# Patient Record
Sex: MALE | Race: OTHER | Employment: FULL TIME | URBAN - METROPOLITAN AREA
[De-identification: names, ages, dates, MRNs, and addresses within clinical notes are randomized per-mention and may not be internally consistent; named-entity substitution may affect disease eponyms.]

---

## 2018-11-10 ENCOUNTER — HOSPITAL ENCOUNTER (EMERGENCY)
Age: 43
Discharge: HOME | End: 2018-11-10
Attending: EMERGENCY MEDICINE
Payer: SELF-PAY

## 2018-11-10 VITALS
SYSTOLIC BLOOD PRESSURE: 151 MMHG | TEMPERATURE: 99.1 F | BODY MASS INDEX: 29.99 KG/M2 | WEIGHT: 180 LBS | RESPIRATION RATE: 18 BRPM | HEART RATE: 102 BPM | HEIGHT: 65 IN | OXYGEN SATURATION: 97 % | DIASTOLIC BLOOD PRESSURE: 85 MMHG

## 2018-11-10 DIAGNOSIS — B34.9 VIRAL SYNDROME: Primary | ICD-10-CM

## 2018-11-10 DIAGNOSIS — K59.00 CONSTIPATION, UNSPECIFIED CONSTIPATION TYPE: ICD-10-CM

## 2018-11-10 LAB
DEPRECATED S PYO AG THROAT QL EIA: NEGATIVE
FLUAV AG NPH QL IA: NEGATIVE
FLUBV AG NOSE QL IA: NEGATIVE

## 2018-11-10 PROCEDURE — 87147 CULTURE TYPE IMMUNOLOGIC: CPT

## 2018-11-10 PROCEDURE — 87070 CULTURE OTHR SPECIMN AEROBIC: CPT

## 2018-11-10 PROCEDURE — 87880 STREP A ASSAY W/OPTIC: CPT

## 2018-11-10 PROCEDURE — 74011250637 HC RX REV CODE- 250/637: Performed by: EMERGENCY MEDICINE

## 2018-11-10 PROCEDURE — 99283 EMERGENCY DEPT VISIT LOW MDM: CPT

## 2018-11-10 PROCEDURE — 87804 INFLUENZA ASSAY W/OPTIC: CPT

## 2018-11-10 RX ORDER — DOCUSATE SODIUM 100 MG/1
100 CAPSULE, LIQUID FILLED ORAL 2 TIMES DAILY
Qty: 60 CAP | Refills: 0 | Status: SHIPPED | OUTPATIENT
Start: 2018-11-10 | End: 2018-12-10

## 2018-11-10 RX ORDER — IBUPROFEN 600 MG/1
600 TABLET ORAL
Status: COMPLETED | OUTPATIENT
Start: 2018-11-10 | End: 2018-11-10

## 2018-11-10 RX ADMIN — IBUPROFEN 600 MG: 600 TABLET, FILM COATED ORAL at 22:14

## 2018-11-11 NOTE — DISCHARGE INSTRUCTIONS
Infecciones virales: Instrucciones de cuidado - [ Viral Infections: Care Instructions ]  Instrucciones de cuidado    Usted no se siente rosette, jagjit no está fermin qué lo está causando. Podría tener luisito infección viral. Los virus provocan muchas enfermedades, leslie el resfriado común, influenza, fiebre, salpullidos, y la diarrea, las náuseas y el vómito que suelen llamarse \"gastroenteritis viral\". Es posible que se pregunte si los medicamentos antibióticos pueden ayudarle a sentirse mejor. Jagjit los antibióticos tratan únicamente infecciones causadas por bacterias. No funcionan para los virus. La buena noticia es que las infecciones virales generalmente no son graves. La mayoría desaparecen en unos pocos días sin tratamiento médico. Mientras tanto, hay algunas cosas que usted puede hacer para estar más cómodo. La atención de seguimiento es luisito parte clave de orozco tratamiento y seguridad. Asegúrese de hacer y acudir a todas las citas, y llame a orozco médico si está teniendo problemas. También es luisito buena idea saber los resultados de shorty exámenes y mantener luisito lista de los medicamentos que andrew. ¿Cómo puede cuidarse en el hogar? · Descanse lo suficiente si se siente agotado. · [place] un analgésico (medicamento para el dolor) de venta mary, leslie acetaminofén (Tylenol), ibuprofeno (Advil, Motrin) o naproxeno (Aleve), si es necesario. Debbie y siga todas las instrucciones de la [place]. · Tenga cuidado cuando tome medicamentos de venta mary para el resfriado o la gripe y Tylenol al [employer]. Muchos de estos medicamentos contienen acetaminofén, o sea, Tylenol. Debbie las etiquetas para asegurarse de que no esté tomando luisito dosis mayor que la recomendada. El exceso de acetaminofén (Tylenol) puede ser dañino. · Lianne abundantes líquidos, suficientes para que orozco orina sea de color amarillo fermin o henrry leslie el agua.  Si tiene luisito enfermedad del riñón, del corazón o del hígado y tiene que Lanny's líquidos, hable con orozco médico antes de aumentar orozco consumo. · Cuando tenga fiebre, no vaya al Aury Hoar, a la escuela ni a otros lugares públicos. ¿Cuándo debe pedir ayuda? Llame al 911 en cualquier momento que considere que necesita atención de emergencia. Por ejemplo, llame si:    · Tiene dificultad grave para respirar.     · Se desmayó (perdió el conocimiento).    Llame a orozco médico ahora mismo o busque atención médica inmediata si:    · Le parece que está mucho más enfermo.     · Tiene fiebre nueva o más sonia.     · Tiene cameron en las heces.     · Tiene dolor de estómago nuevo o que [facility].     · Tiene un nuevo salpullido.    Preste especial atención a los cambios en orozco jose y asegúrese de comunicarse con orozco médico si:    · [place] a mejorar y luego empeora.     · No mejora leslie se esperaba. ¿Dónde puede encontrar más información en inglés? Asif Pique a http://neptali-tristian.info/. Floydene Jacque K339 en la búsqueda para aprender más acerca de \"Infecciones virales: Instrucciones de cuidado - [ Viral Infections: Care Instructions ]. \"  Revisado: 18 noviembre, 2017  Versión del contenido: 11.8  © 7168-0457 Healthwise, Incorporated. Las instrucciones de cuidado fueron adaptadas bajo licencia por Good Help Connections (which disclaims liability or warranty for this information). Si usted tiene [place] afección médica o sobre estas instrucciones, siempre pregunte a orozco profesional de jose. Healthwise, Incorporated niega toda garantía o responsabilidad por orozco uso de esta información. Estreñimiento: Instrucciones de cuidado - [ Constipation: Care Instructions ]  Instrucciones de cuidado    Tener estreñimiento significa que usted tiene dificultades para eliminar las heces (evacuaciones del intestino). Las personas eliminan heces entre 3 veces al día y [facility] vez cada 3 días. Lo que es normal para usted puede ser [employer]. El estreñimiento puede ocurrir con dolor en el recto y cólicos.  El dolor podría empeorar cuando trata de eliminar las heces. A veces hay pequeñas cantidades de cameron kiko viva en el papel higiénico o en la superficie de las heces. [place] se debe a las venas dilatadas cerca del recto (hemorroides). Algunos cambios en orozco Justin Dk y estilo de shasta podrían ayudarle a evitar el estreñimiento continuo. Es posible que el médico además le recete medicamentos para ayudar a aflojar las heces. Algunos medicamentos pueden causar estreñimiento. [place] incluye los analgésicos (medicamentos para el dolor) y los antidepresivos. Infórmele a orozco médico sobre Yasmeen Lee que usted andrew. Es posible que orozco médico quiera cambiar un medicamento para aliviar shorty síntomas. La atención de seguimiento es luisito parte clave de orozco tratamiento y seguridad. Asegúrese de hacer y acudir a todas las citas, y llame a orozco médico si está teniendo problemas. También es luisito buena idea saber los resultados de shorty exámenes y mantener luisito lista de los medicamentos que andrew. ¿Cómo puede cuidarse en el hogar? · Lianne abundantes líquidos, los suficientes leslie para que orozco orina sea de color amarillo fermin o transparente leslie el agua. Si tiene [employer], del corazón o del hígado y tiene que [facility] líquidos, hable con orozco médico antes de aumentar orozco consumo. · Incluya en orozco dieta diaria alimentos ricos en fibra. Estos incluyen frutas, verduras, frijoles (habichuelas) y granos integrales. · Marialuisa por lo menos 30 minutos de ejercicio la mayoría de los días de la [place]. Caminar es luisito buena opción. Es posible que también quiera hacer otras actividades, leslie correr, nadar, [employer], o jugar al tenis u otros deportes de equipo. · [place] un suplemento de [place], leslie Cit[employer] o Metamucil, todos los GRASSE. Debbie y siga todas las indicaciones de la [place]. · Programe tiempo todos los días para evacuar el intestino. Andre hayward diaria podría ayudar. Tómese orozco tiempo para evacuar el intestino.   · Apoye los pies sobre un banco o taburete pequeño cuando se siente en el inodoro. [place] ayuda a flexionar las caderas y coloca la pelvis en posición de cuclillas. · [place] médico podría recomendarle un laxante de venta mary para aliviar el estreñimiento. Cele Sears son Darin Banegas de Magnesia (Milk of Magnesia) y [place]. Debbie y siga todas las instrucciones de la [place]. No use laxantes de [employer]. ¿Cuándo debe pedir ayuda? Llame a woodard médico ahora mismo o busque atención médica inmediata si:    · Tiene dolor abdominal nuevo o peor.     · Tiene náuseas o vómito nuevos o peores.     · Tiene cameron en las heces.    Preste especial atención a los cambios en woodard jose y asegúrese de comunicarse con woodard médico si:    · [place] estreñimiento empeora.     · No mejora leslie se esperaba. ¿Dónde puede encontrar más información en inglés? Yue Gale a http://neptali-tristian.info/. Escriba P343 en la búsqueda para aprender Majo Magic de \"Estreñimiento: Instrucciones de cuidado - [ Constipation: Care Instructions ]. \"  Revisado: 20 noviembre, 2017  Versión del contenido: 11.8  © 2006-2018 Healthwise, Incorporated. Las instrucciones de cuidado fueron adaptadas bajo licencia por Good [place] Conne[employer] (which disclaims liability or warranty for this information). Si usted tiene [place] afección médica o sobre estas instrucciones, siempre pregunte a [place] profesional de jose. Healthwise, Incorporated niega toda garantía o responsabilidad por woodard uso de esta información.

## 2018-11-11 NOTE — ED TRIAGE NOTES
\"I have had fever for 4 days. \" Patient reports pain in his left abdomen that has been present for about a year, but has worsened today. He also complains of sore throat. He describes having constipation frequently.

## 2018-11-11 NOTE — ED PROVIDER NOTES
36 y.o. male with no significant past medical history who presents ambulatory to the ED with multiple complaints. He states that for the last 4 days he has been having intermittent fevers with associated sore throat and diaphoresis. Pt denies dysuria and N/V/D. He also states that for about a year now, he has been experiencing LUQ pain that is exacerbated by eating but alleviated with bowel movements. He rates his pain a 3/10 in the ED. His last bowel movement was about an hour ago, and was nml. There are no other acute medical concerns at this time. PCP: None Note written by Phill Chavez, as dictated by Douglas Kelly MD 9:52 PM 
 
 
 
The history is provided by the patient. No  was used. No past medical history on file. No past surgical history on file. No family history on file. Social History Socioeconomic History  Marital status:  Spouse name: Not on file  Number of children: Not on file  Years of education: Not on file  Highest education level: Not on file Social Needs  Financial resource strain: Not on file  Food insecurity - worry: Not on file  Food insecurity - inability: Not on file  Transportation needs - medical: Not on file  Transportation needs - non-medical: Not on file Occupational History  Not on file Tobacco Use  Smoking status: Not on file Substance and Sexual Activity  Alcohol use: Not on file  Drug use: Not on file  Sexual activity: Not on file Other Topics Concern  Not on file Social History Narrative  Not on file ALLERGIES: Patient has no known allergies. Review of Systems Constitutional: Positive for diaphoresis and fever. Negative for appetite change, chills and unexpected weight change. HENT: Positive for sore throat. Negative for ear pain, hearing loss, rhinorrhea and trouble swallowing. Eyes: Negative for pain and visual disturbance. Respiratory: Negative for cough, chest tightness and shortness of breath. Cardiovascular: Negative for chest pain and palpitations. Gastrointestinal: Positive for abdominal pain. Negative for abdominal distention, blood in stool, constipation, diarrhea, nausea and vomiting. Genitourinary: Negative for difficulty urinating, dysuria, hematuria and urgency. Musculoskeletal: Negative for back pain and myalgias. Skin: Negative for rash. Neurological: Negative for dizziness, syncope, weakness and numbness. Psychiatric/Behavioral: Negative for confusion and suicidal ideas. All other systems reviewed and are negative. Vitals:  
 11/10/18 2140 BP: 151/85 Pulse: (!) 102 Resp: 18 Temp: 99.1 °F (37.3 °C) SpO2: 97% Weight: 81.6 kg (180 lb) Height: 5' 5\" (1.651 m) Physical Exam  
Constitutional: He is oriented to person, place, and time. He appears well-developed and well-nourished. No distress. NAD, AxOx4, speaking in complete sentences HENT:  
Head: Normocephalic and atraumatic. Right Ear: Hearing normal. No tenderness. Left Ear: Hearing normal. No tenderness. Mouth/Throat: Uvula is midline. No oral lesions. No trismus in the jaw. No uvula swelling. Posterior oropharyngeal erythema present. No oropharyngeal exudate. Tonsils are 2+ on the right. Tonsils are 2+ on the left. Tonsillar exudate. Eyes: Conjunctivae and EOM are normal. Pupils are equal, round, and reactive to light. Right eye exhibits no discharge. Left eye exhibits no discharge. Neck: Normal range of motion. Neck supple. Cardiovascular: Normal rate, regular rhythm and intact distal pulses. Exam reveals no gallop and no friction rub. No murmur heard. Pulmonary/Chest: Effort normal and breath sounds normal. No respiratory distress. He has no wheezes. He has no rales. He exhibits no tenderness. Abdominal: Soft.  Bowel sounds are normal. He exhibits no distension and no mass. There is no tenderness. There is no rebound and no guarding. nttp Genitourinary:  
Genitourinary Comments: No palpable inguinal hernias Pt denies urinary/ Testicular/ scrotal or penile  complaints Musculoskeletal: Normal range of motion. He exhibits no edema, tenderness or deformity. Lymphadenopathy:  
  He has no cervical adenopathy. Neurological: He is alert and oriented to person, place, and time. No cranial nerve deficit. Coordination normal.  
pt has motor/ CV/ Sensation grossly intact to all extremities, R = L in strength;  
Skin: Skin is warm and dry. No rash noted. No erythema. Psychiatric: He has a normal mood and affect. Nursing note and vitals reviewed. MDM Procedures 10:40 PM Pt w/ exudates/ neg swabs/ will culture/ abd pain (x 1 year) resolved after BM today/ will give colace RX and pcp follow-up; Pt agrees w/ plans; [employer]  results have been reviewed with him. He has been counseled regarding his diagnosis. He verbally conveys understanding and agreement of the signs, symptoms, diagnosis, treatment and prognosis and additionally agrees to Call/ Arrange follow up as recommended with Dr. None in 24 - 48 hours. He also agrees with the care-plan and conveys that all of his questions have been answered. I have also put together some discharge instructions for him that include: 1) educational information regarding their diagnosis, 2) how to care for their diagnosis at home, as well a 3) list of reasons why they would want to return to the ED prior to their follow-up appointment, should their condition change or for concerns.

## 2018-11-13 LAB
BACTERIA SPEC CULT: ABNORMAL
BACTERIA SPEC CULT: ABNORMAL
SERVICE CMNT-IMP: ABNORMAL

## 2019-08-15 ENCOUNTER — OFFICE VISIT (OUTPATIENT)
Dept: FAMILY MEDICINE CLINIC | Age: 44
End: 2019-08-15

## 2019-08-15 VITALS
TEMPERATURE: 98.2 F | SYSTOLIC BLOOD PRESSURE: 123 MMHG | DIASTOLIC BLOOD PRESSURE: 75 MMHG | WEIGHT: 172 LBS | BODY MASS INDEX: 28.66 KG/M2 | OXYGEN SATURATION: 96 % | HEART RATE: 72 BPM | HEIGHT: 65 IN | RESPIRATION RATE: 16 BRPM

## 2019-08-15 DIAGNOSIS — F10.11 HISTORY OF ALCOHOL ABUSE: ICD-10-CM

## 2019-08-15 DIAGNOSIS — R10.12 LEFT UPPER QUADRANT PAIN: Primary | ICD-10-CM

## 2019-08-15 DIAGNOSIS — F19.91 HISTORY OF DRUG USE: ICD-10-CM

## 2019-08-15 DIAGNOSIS — G47.00 INSOMNIA, UNSPECIFIED TYPE: ICD-10-CM

## 2019-08-15 RX ORDER — OMEPRAZOLE 20 MG/1
20 CAPSULE, DELAYED RELEASE ORAL DAILY
Qty: 30 CAP | Refills: 0 | Status: SHIPPED | OUTPATIENT
Start: 2019-08-15 | End: 2019-10-07 | Stop reason: SDUPTHER

## 2019-08-15 NOTE — PATIENT INSTRUCTIONS
Dolor abdominal: Instrucciones de cuidado - [ Abdominal Pain: Care Instructions ] Instrucciones de cuidado El dolor abdominal tiene muchas causas posibles. Algunas de ellas no son graves y mejoran por sí solas en unos días. Otras requieren Radha Tabitha y [place]. Si orozco dolor continúa o [place], necesitará luisito nueva revisión y [place] pruebas para determinar qué pasa. Es posible que necesite cirugía para corregir el problema. No ignore nuevos síntomas, leslie fiebre, náuseas y [place], [facility] [place], dolor que [place] o [place]. Podrían ser señales de un problema más grave. Orozco médico puede haberle recomendado luisito consulta de Wenceslao & Albertina las 8 o 12 horas siguientes. Si no se siente mejor, es posible que requiera Radha Tabitha o [place]. El médico lo fernandez revisado minuciosamente, fox puede delilah problemas más tarde. Si nota algún problema o síntomas nuevos, busque tratamiento médico inmediatamente. La atención de seguimiento es luisito parte clave de orozco tratamiento y seguridad. Asegúrese de hacer y acudir a todas las citas, y llame a orozco médico si está teniendo problemas. También es luisito buena idea saber los resultados de shorty exámenes y mantener luisito lista de los medicamentos que andrew. Cómo puede cuidarse en el hogar? · Descanse hasta que se sienta mejor. · Para prevenir la deshidratación, ernst abundantes líquidos, suficientes para que orozco orina sea de color amarillo fermin o transparente leslie el agua. Elija beber agua y otros líquidos shital sin cafeína hasta que se sienta mejor. Si tiene [facility] [employer], del corazón o del hígado y tiene que Leoma's líquidos, hable con orozco médico antes de aumentar orozco consumo. · Si tiene [place] [employer], coma alimentos suaves, leslie arroz, pan santana seco o galletas saladas, bananas (plátanos) y puré de Synchari. Trate de comer varias comidas pequeñas al día en lugar de dos o matilda grandes. · Espere hasta 48 horas después de que todos los síntomas hayan desaparecido antes de comer alimentos condimentados, alcohol y bebidas que contengan cafeína. · No consuma alimentos ricos en grasa. · Evite medicamentos antiinflamatorios leslie aspirina, ibuprofeno (Advil, Motrin) y naproxeno (Aleve). Pueden causar [place] [employer]. Dígale a orozco médico si está tomando aspirina diariamente debido a otro problema de jose. Cuándo debe pedir ayuda? Llame al 911 en cualquier momento que considere que necesita atención de emergencia. Por ejemplo, llame si: 
  · Se desmayó (perdió el conocimiento).   · Las heces son de color rojizo o muy sanguinolentas (con cameron).   · Vomita cameron o algo parecido a granos de café molido.  
  · Tiene dolor abdominal nuevo e intenso.  
 Llame a orozco médico ahora mismo o busque atención médica inmediata si: 
  · Orozco dolor empeora, sobre todo si se concentra en luisito tony parte del vientre.  
  · Vuelve a tener fiebre o tiene fiebre más sonia.  
  · Viviana heces son negruzcas y parecidas al alquitrán o tienen rastros de cameron.  
  · Tiene sangrado vaginal inesperado.  
  · Tiene síntomas de liusito infección del tracto urinario. Estos podrían incluir: ? Dolor al Earlie Oddi. ? Orinar con más frecuencia que lo habitual. 
? [place] en la [facility].  
  · Siente mareos o aturdimiento, o que está a punto de desmayarse.  
 Preste especial atención a los cambios en orozco jose y asegúrese de comunicarse con orozco médico si: 
  · No está mejorando después de 1 día (24 horas). Dónde puede encontrar más información en inglés? Keeley Mason a http://neptali-tristian.info/. Andreina Mcdermott K175 en la búsqueda para aprender más acerca de \"Dolor abdominal: Instrucciones de cuidado - [ Abdominal Pain: Care Instructions ]. \" 
Revisado: 23 septiembre, 2018 Versión del contenido: 12.1 © 5554-3392 Healthwise, Incorporated.  Las instrucciones de cuidado fueron adaptadas bajo licencia por Good [employer] (which disclaims liability or warranty for this information). Si usted tiene [place] afección médica o sobre estas instrucciones, siempre pregunte a orozco profesional de jose. [employer], Incorporated niega toda garantía o responsabilidad por orozco uso de esta información. Insomnio: Instrucciones de cuidado - [ Insomnia: Care Instructions ] Instrucciones de cuidado El insomnio es la incapacidad para dormir rosette. Es un problema común para la mayoría de las personas en algún momento. El insomnio puede hacer que resulte difícil dormirse, permanecer dormido o dormir el tiempo necesario. [place] puede provocarle fatiga y mal humor oli el día. También puede hacer que esté olvidadizo e infeliz y que sea menos eficiente en [place]. Algunos trastornos, [employer] depresión o la ansiedad, pueden provocar insomnio. El dolor también puede afectar orozco capacidad para dormir. Cuando se resuelven [place], el insomnio generalmente desaparece. A veces, los malos hábitos de sueño pueden provocar insomnio. Si el insomnio le afecta en orozco trabajo o orozco capacidad para disfrutar la shasta, puede yolanda medidas para mejorar el sueño. La atención de seguimiento es luisito parte clave de orozco tratamiento y seguridad. Asegúrese de hacer y acudir a todas las citas, y llame a orozco médico si está teniendo problemas. También es luisito buena idea saber los resultados de shorty exámenes y mantener luisito lista de los medicamentos que andrew. Cómo puede cuidarse en el hogar? Bethanie Hanly · No tome bebidas con cafeína, leslie café o té bud, oli las 8 horas antes de [place]. · No fume ni use otros tipos de tabaco cerca de la hora de acostarse. La nicotina es un estimulante y puede mantenerlo despierto. · Evite beber alcohol por la noche, porque puede hacer que se despierte en la mitad de la noche. · No coma luisito comida abundante cerca de la hora de [place].  Si tiene hambre, coma algo liviano. · No tome mucha agua cerca de la hora de [place], porque la necesidad de orinar puede despertarlo oli la noche. · No malik ni rodrigo televisión en la cama. Use la cama solo para dormir y para tener relaciones sexuales. Abida Showers · Clyde Sanchez a dormir a la misma hora todas las noches y levántese a la misma hora cada mañana. No duerma la siesta oli el día. · Mantenga woodard dormitorio silencioso, oscuro y fresco. 
· Duerma con Mario Barr y un colchón cómodos. · Si mirar el reloj le causa ansiedad, gírelo de Eusebia [place]ppton que no pueda michael la hora. · Si tiene preocupaciones cuando se acuesta, lleve un diario de viviana preocupaciones. [place] antes de la hora de [place], escriba las preocupaciones y luego deje de lado el diario y viviana inquietudes. · Pruebe la meditación u otras técnicas de relajación antes de [place]. · Si no puede dormir, levántese y Jackye Min a otra habitación hasta que sienta sueño. Marialuisa algo que lo relaje. Repita la rutina de acostarse antes de volver a la cama. · Marialuisa que woodard hogar esté en silencio y en calma aproximadamente luisito hora antes de acostarse. Baje la intensidad de las luces, apague el televisor y la computadora, y baje el volumen de la [place]. [place] puede ayudarle a relajarse después de un día ajetreado. Cuándo debe pedir ayuda? Preste especial atención a los cambios en woodard jose y asegúrese de comunicarse con woodard médico si: 
  · Viviana esfuerzos por mejorar el sueño no michael resultado.  
  · [place] insomnio empeora.  
  · Se ha estado sintiendo decaído, deprimido o desesperanzado, o ha perdido el interés por cosas que disfrutaba hacer. Dónde puede encontrar más información en inglés? Clyde Sanchez a http://neptali-tristian.info/. Escriba P513 en la búsqueda para aprender más acerca de \"Insomnio: Instrucciones de cuidado - [ Insomnia: Care Instructions ]. \" 
Revisado: 28 junio, 2018 Versión del contenido: 12.1 © 0884-5655 Healthwise, [place]. Las instrucciones de cuidado fueron adaptadas bajo licencia por Good Help Connections (which disclaims liability or warranty for this information). Si usted tiene [place] afección médica o sobre estas instrucciones, siempre pregunte a orozco profesional de jose. [facility], [place] niega toda garantía o responsabilidad por orozco uso de esta información.

## 2019-08-15 NOTE — PROGRESS NOTES
OSIRIS       Ana Rosales is a 39 y.o. male who presents for abdominal pain and to establish care  Abdominal pain: described more as discomfort. Located on left side of abdomen (more towards the ribs), present for the last 2 years, worst with food (doesn't matter what type of food), no radiation. Sometimes takes ibuprofen or tylenol that helps very little  Has regular BM, normal urination   History of kidney stones- last time was in [id number], he passed them on his own   No fever, no diarrhea, no constipation, no blood in stool, no vomiting   Insomnia: since he stopped drinking alcohol. He was taking natural pills (herbal life) that used to help him but not anymore. He has tried melatonin as well but they dont help him anymore  Sometimes he cant fall asleep or sometimes he wakes up 3-4 times at night. He admits he doesn't have a regular schedule to go to bed. Sometimes he goes to bed at 10 or midnight or 2-3 am due to L[employer] day related activities. SH:  Alcohol- stopped drinking 7 years ago. Used to drink a lot- everyday drinker  Smoking- stopped smoking 7 years ago. 1 PPD for 5 years   Drugs- stopped 7 years ago as well. Could not say which ones as his kids were present for interview         PMHx-reviewed:  History reviewed. No pertinent past medical history. Meds-reviewed:   Current Outpatient Medications   Medication Sig Dispense Refill    omeprazole (PRILOSEC) 20 mg capsule Take 1 Cap by mouth daily. 30 Cap 0    ibuprofen (MOTRIN) 800 mg tablet Take 1 Tab by mouth every six (6) hours as needed for Pain. 30 Tab 0     Allergies-reviewed:   No Known Allergies    Smoker-reviewed:  Social History     Tobacco Use   Smoking Status Former Smoker    Last attempt to quit: 8/15/2014    Years since quittin.0   Smokeless Tobacco Never Used     ETOH-reviewed:   Social History     Substance and Sexual Activity   Alcohol Use Not Currently     FH-reviewed:   History reviewed.  No pertinent family history. ROS:  Review of Systems   Constitutional: Negative. HENT: Negative. Respiratory: Negative. Cardiovascular: Negative. Gastrointestinal: Positive for abdominal pain. Negative for blood in stool, constipation, diarrhea, nausea and vomiting. Genitourinary: Negative. Musculoskeletal: Negative. Skin: Negative. Neurological: Negative. Psychiatric/Behavioral: Positive for sleep disturbance. Physical Exam:  Visit Vitals  /75 (BP 1 Location: Left arm, BP Patient Position: Sitting)   Pulse 72   Temp 98.2 °F (36.8 °C) (Oral)   Resp 16   Ht 5' 5\" (1.651 m)   Wt 172 lb (78 kg)   SpO2 96%   BMI 28.62 kg/m²       Wt Readings from Last 3 Encounters:   08/15/19 172 lb (78 kg)   11/10/18 180 lb (81.6 kg)   12/22/16 180 lb (81.6 kg)     BP Readings from Last 3 Encounters:   08/15/19 123/75   11/10/18 151/85   12/22/16 138/87      Physical Exam   Constitutional: He appears well-developed and well-nourished. No distress. HENT:   Right Ear: External ear normal.   Left Ear: External ear normal.   Mouth/Throat: Oropharynx is clear and moist. No oropharyngeal exudate. Eyes: Pupils are equal, round, and reactive to light. Conjunctivae are normal. Right eye exhibits no discharge. Left eye exhibits no discharge. Neck: Normal range of motion. Cardiovascular: Normal rate and regular rhythm. No murmur heard. Pulmonary/Chest: Effort normal and breath sounds normal. He has no wheezes. He has no rales. Abdominal: Soft. Bowel sounds are normal. There is no hepatosplenomegaly. There is tenderness in the left upper quadrant. There is no rigidity, no rebound, no guarding and no CVA tenderness. Musculoskeletal: Normal range of motion. Skin: Skin is warm and dry. No rash noted. Psychiatric: He has a normal mood and affect. His behavior is normal.   Vitals reviewed. Assessment     39 y.o. male with:    ICD-10-CM ICD-9-CM    1.  Left upper quadrant pain R10.12 789.02 CBC W/O DIFF METABOLIC PANEL, COMPREHENSIVE      LIPID PANEL      LIPASE      HEPATITIS PANEL, ACUTE      omeprazole (PRILOSEC) 20 mg capsule   2. Insomnia, unspecified type G47.00 780.52    3. History of alcohol abuse I36.599 014.03 METABOLIC PANEL, COMPREHENSIVE      LIPASE   4. History of drug use Z87.898 305.93 HEPATITIS PANEL, ACUTE              Plan       Orders Placed This Encounter    CBC W/O DIFF    METABOLIC PANEL, COMPREHENSIVE    LIPID PANEL    LIPASE    HEPATITIS PANEL, ACUTE    omeprazole (PRILOSEC) 20 mg capsule     - LUQ pain- differential includes pancreatitis, gastritis, PUD. Will get labs today (as above) and if they're normal, will get CT abdomen for further evaluation. Counseled patient to stop taking ibuprofen due to consideration of gastritis/PUD. Can take tylenol prn for pain. Will start Omeprazole 20 mg and try for 4 weeks to see If this helps with symptoms  - Insomnia- talked to patient about sleep hygiene: no TV in the bedroom, dark room, low temp. Avoid using phone before going to bed    Patient discussed with Dr. Mckayla Pace    I have discussed the diagnosis with the patient and the intended plan as seen in the above orders. The patient has received an after-visit summary and questions were answered concerning future plans. I have discussed medication side effects and warnings with the patient as well.     Yessenia Regalado MD  Family Medicine Resident  PGY-3

## 2019-08-15 NOTE — PROGRESS NOTES
Chief Complaint   Patient presents with   [facility] Care     1. Have you been to the ER, urgent care clinic since your last visit? Hospitalized since your last visit? No    2. Have you seen or consulted any other health care providers outside of the [address] [place] [address] since your last visit? Include any pap smears or colon screening.  No

## 2019-08-16 ENCOUNTER — TELEPHONE (OUTPATIENT)
Dept: FAMILY MEDICINE CLINIC | Age: 44
End: 2019-08-16

## 2019-08-16 DIAGNOSIS — F10.11 HISTORY OF ALCOHOL ABUSE: ICD-10-CM

## 2019-08-16 DIAGNOSIS — E78.1 HYPERTRIGLYCERIDEMIA: ICD-10-CM

## 2019-08-16 DIAGNOSIS — R10.12 LEFT UPPER QUADRANT PAIN: ICD-10-CM

## 2019-08-16 DIAGNOSIS — R74.01 TRANSAMINITIS: Primary | ICD-10-CM

## 2019-08-16 LAB
ALBUMIN SERPL-MCNC: 5 G/DL (ref 3.5–5.5)
ALBUMIN/GLOB SERPL: 1.7 {RATIO} (ref 1.2–2.2)
ALP SERPL-CCNC: 118 IU/L (ref 39–117)
ALT SERPL-CCNC: 67 IU/L (ref 0–44)
AST SERPL-CCNC: 35 IU/L (ref 0–40)
BILIRUB SERPL-MCNC: 0.5 MG/DL (ref 0–1.2)
BUN SERPL-MCNC: 16 MG/DL (ref 6–24)
BUN/CREAT SERPL: 19 (ref 9–20)
CALCIUM SERPL-MCNC: 10 MG/DL (ref 8.7–10.2)
CHLORIDE SERPL-SCNC: 102 MMOL/L (ref 96–106)
CHOLEST SERPL-MCNC: 139 MG/DL (ref 100–199)
CO2 SERPL-SCNC: 20 MMOL/L (ref 20–29)
CREAT SERPL-MCNC: 0.84 MG/DL (ref 0.76–1.27)
ERYTHROCYTE [DISTWIDTH] IN BLOOD BY AUTOMATED COUNT: 13.9 % (ref 12.3–15.4)
GLOBULIN SER CALC-MCNC: 3 G/DL (ref 1.5–4.5)
GLUCOSE SERPL-MCNC: 272 MG/DL (ref 65–99)
HAV IGM SERPL QL IA: NEGATIVE
HBV CORE IGM SERPL QL IA: NEGATIVE
HBV SURFACE AG SERPL QL IA: NEGATIVE
HCT VFR BLD AUTO: 43.2 % (ref 37.5–51)
HCV AB S/CO SERPL IA: <0.1 S/CO RATIO (ref 0–0.9)
HDLC SERPL-MCNC: 34 MG/DL
HGB BLD-MCNC: 15.1 G/DL (ref 13–17.7)
INTERPRETATION, 910389: NORMAL
LDLC SERPL CALC-MCNC: 27 MG/DL (ref 0–99)
LIPASE SERPL-CCNC: 27 U/L (ref 13–78)
MCH RBC QN AUTO: 31.3 PG (ref 26.6–33)
MCHC RBC AUTO-ENTMCNC: 35 G/DL (ref 31.5–35.7)
MCV RBC AUTO: 90 FL (ref 79–97)
PLATELET # BLD AUTO: 302 X10E3/UL (ref 150–450)
POTASSIUM SERPL-SCNC: 5.3 MMOL/L (ref 3.5–5.2)
PROT SERPL-MCNC: 8 G/DL (ref 6–8.5)
RBC # BLD AUTO: 4.82 X10E6/UL (ref 4.14–5.8)
SODIUM SERPL-SCNC: 141 MMOL/L (ref 134–144)
TRIGL SERPL-MCNC: 390 MG/DL (ref 0–149)
VLDLC SERPL CALC-MCNC: 78 MG/DL (ref 5–40)
WBC # BLD AUTO: 7.9 X10E3/UL (ref 3.4–10.8)

## 2019-08-16 RX ORDER — ROSUVASTATIN CALCIUM 20 MG/1
20 TABLET, COATED ORAL
Qty: 30 TAB | Refills: 3 | Status: SHIPPED | OUTPATIENT
Start: 2019-08-16 | End: 2019-09-12 | Stop reason: SINTOL

## 2019-08-16 NOTE — TELEPHONE ENCOUNTER
Called patient about lab results: CBC, Lipase normal  Fasting glucose 272 in CMP. Will add an A1c to labs   K 5.3. Will monitor and repeat laboratory when he returns in month. Work on decreasing high potassium diet  ALT 67, Alk phos 118. Patient with history of alcohol abuse. Hepatitis panel neg. Will get imaging to evaluate liver and check more labs  Lipid panel with elevated TG, low HLD and high VLDL. Will start him on statin and work on diet and exercise  Left voice message for patient to return the call.  Will try to contact him at another time    José Miguel Meyers MD  [facility] Medicine Resident  PGY-3

## 2019-08-16 NOTE — PROGRESS NOTES
CBC, Lipase normal  Fasting glucose 272 in CMP. Will add an A1c to labs   K 5.3. Will monitor and repeat laboratory. Work on decreasing high potassium diet  ALT 67, Alk phos 118. Patient with history of alcohol abuse. Hepatitis panel neg. Will get imaging to evaluate liver and check more labs  Lipid panel with elevated TG, low HLD and high VLDL.  Will start him on statin and work on diet and exercise

## 2019-08-29 ENCOUNTER — HOSPITAL ENCOUNTER (OUTPATIENT)
Dept: ULTRASOUND IMAGING | Age: 44
Discharge: HOME | End: 2019-08-29
Attending: FAMILY MEDICINE
Payer: SELF-PAY

## 2019-08-29 DIAGNOSIS — E78.1 HYPERTRIGLYCERIDEMIA: ICD-10-CM

## 2019-08-29 DIAGNOSIS — F10.11 HISTORY OF ALCOHOL ABUSE: ICD-10-CM

## 2019-08-29 DIAGNOSIS — R10.12 LEFT UPPER QUADRANT PAIN: ICD-10-CM

## 2019-08-29 DIAGNOSIS — R74.01 TRANSAMINITIS: ICD-10-CM

## 2019-08-29 PROCEDURE — 76700 US EXAM ABDOM COMPLETE: CPT

## 2019-09-12 ENCOUNTER — ROUTINE PRENATAL (OUTPATIENT)
Dept: FAMILY MEDICINE CLINIC | Age: 44
End: 2019-09-12

## 2019-09-12 ENCOUNTER — OFFICE VISIT (OUTPATIENT)
Dept: FAMILY MEDICINE CLINIC | Age: 44
End: 2019-09-12

## 2019-09-12 VITALS
WEIGHT: 172 LBS | DIASTOLIC BLOOD PRESSURE: 69 MMHG | TEMPERATURE: 98 F | SYSTOLIC BLOOD PRESSURE: 113 MMHG | HEIGHT: 65 IN | RESPIRATION RATE: 16 BRPM | OXYGEN SATURATION: 97 % | BODY MASS INDEX: 28.66 KG/M2 | HEART RATE: 70 BPM

## 2019-09-12 VITALS
OXYGEN SATURATION: 97 % | BODY MASS INDEX: 28.66 KG/M2 | DIASTOLIC BLOOD PRESSURE: 69 MMHG | TEMPERATURE: 98 F | HEIGHT: 65 IN | WEIGHT: 172 LBS | SYSTOLIC BLOOD PRESSURE: 113 MMHG | HEART RATE: 70 BPM | RESPIRATION RATE: 16 BRPM

## 2019-09-12 DIAGNOSIS — R10.12 LEFT UPPER QUADRANT PAIN: ICD-10-CM

## 2019-09-12 DIAGNOSIS — E78.1 HYPERTRIGLYCERIDEMIA: Primary | ICD-10-CM

## 2019-09-12 DIAGNOSIS — R73.09 ELEVATED GLUCOSE LEVEL: ICD-10-CM

## 2019-09-12 DIAGNOSIS — R74.01 TRANSAMINITIS: ICD-10-CM

## 2019-09-12 DIAGNOSIS — E87.5 HYPERKALEMIA: ICD-10-CM

## 2019-09-12 RX ORDER — ATORVASTATIN CALCIUM 40 MG/1
20 TABLET, FILM COATED ORAL DAILY
Qty: 30 TAB | Refills: 3 | Status: SHIPPED | OUTPATIENT
Start: 2019-09-12 | End: 2019-09-12

## 2019-09-12 RX ORDER — ATORVASTATIN CALCIUM 20 MG/1
20 TABLET, FILM COATED ORAL DAILY
Qty: 30 TAB | Refills: 3 | Status: SHIPPED | OUTPATIENT
Start: 2019-09-12

## 2019-09-12 NOTE — PATIENT INSTRUCTIONS
Aprenda sobre el azúcar sonia en la cameron - [ Learning About High Blood Sugar ]  ¿Qué es el azúcar sonia en la cameron? El cuerpo [employer] alimentos que usted come en glucosa (azúcar), la cual [place] para obtener energía. Jagjit si orozco cuerpo es incapaz de utilizar el azúcar de inmediato, puede acumularse en la cameron y provocar un nivel alto de azúcar en la cameron. Cuando la cantidad de azúcar en la cameron permanece muy sonia por demasiado tiempo, usted puede tener diabetes. La diabetes es [facility] enfermedad que puede causar problemas graves de [facility]. Lo huitron es que hacer cambios en orozco estilo de shasta puede ayudarle a hacer que el azúcar en la cameron vuelva a un nivel normal y ayudarle a evitar o retrasar la diabetes. ¿Cuál es la causa del azúcar sonia en la cameron? El azúcar (glucosa) puede acumularse en la cameron si usted:  · Tiene sobrepeso. · Tiene antecedentes familiares de diabetes. · Christa ciertos medicamentos, leslie esteroides. ¿Cuáles son los síntomas? Tener azúcar sonia en la cameron puede no causar ningún síntoma. O puede hacerle sentir mucha sed o mucha hambre. También puede orinar con más frecuencia de lo normal, tener visión borrosa o perder peso sin intentarlo. ¿Cómo se trata el azúcar sonia en la cameron? Usted puede yolanda medidas para reducir orozco nivel de azúcar en la cameron si entiende lo que lo eleva. Orozco médico puede desear que usted aprenda a revisarse el nivel de azúcar en la cameron en casa. Entonces puede michael cómo la enfermedad, el estrés o diferentes tipos de alimentos o medicamentos aumentan o disminuyen orozco nivel de azúcar en la cameron. Pueden ser necesarias otras pruebas para michael si tiene diabetes. ¿Cómo se puede prevenir el azúcar sonia en la cameron? · Controle orozco peso. Si tiene sobrepeso, bajar solo un poco de peso puede Sue edwin. Reducir la grasa alrededor de orozco cintura es lo más importante. · Limite la cantidad de calorías, dulces y grasas poco saludables que come.  Pregúntele a orozco médico si un dietista puede ayudarle. Un dietista registrado puede ayudarle a elaborar planes de alimentación que se adapten a orozco estilo de shasta. · Marialuisa al menos 30 minutos de ejercicio la mayoría de los días de la [place]. El ejercicio ayuda a controlar el azúcar en la cameron. También ayuda a mantener un peso saludable. Caminar es luisito buena opción. Es posible que también quiera hacer otras actividades, leslie correr, nadar, [employer], o jugar tenis o deportes de equipo. · Si orozco médico le recetó medicamentos, tómelos exactamente leslie se le indicó. Llame a orozco médico si kaorlina que está teniendo un problema con orozco medicamento. Recibirá [employer] medicamentos específicos recetados por orozco médico.  La atención de seguimiento es luisito parte clave de orozco tratamiento y seguridad. Asegúrese de hacer y acudir a todas las citas, y llame a orozco médico si está teniendo problemas. También es luisito buena idea saber los resultados de shorty exámenes y mantener luisito lista de los medicamentos que andrew. ¿Dónde puede encontrar más información en inglés? [place] a http://neptali-tristian.info/. Escriba O108 en la búsqueda para aprender más acerca de \"Aprenda sobre el azúcar sonia en la cameron - [ Learning About High Blood Sugar ]. \"  Revisado: 25 julio, 2018  Versión del contenido: 12.1  © 7895-6093 Healthwise, Incorporated. Las instrucciones de cuidado fueron adaptadas bajo licencia por [employer] (which disclaims liability or warranty for this information). Si usted tiene [place] afección médica o sobre estas instrucciones, siempre pregunte a orozco profesional de jose. [facility] niega toda garantía o responsabilidad por orozco uso de esta información. Aprenda sobre el colesterol alto - [ Learning About High Cholesterol ]  ¿Qué es el colesterol alto? El colesterol es un tipo de grasa que está presente en la [place].  Es necesario para muchas funciones corporales, leslie producir nuevas células. El colesterol se produce en el cuerpo y [place] proviene de los alimentos que se consumen. Si usted tiene [employer], carl comienza a acumularse en viviana arterias. [place] se llama endurecimiento de las arterias o aterosclerosis. El colesterol alto eleva orozco riesgo de tener un ataque al corazón y un ataque cerebral.  Hay diferentes tipos de colesterol. El LDL es el colesterol \"phillip\". Tyson Rump el LDL puede elevar orozco riesgo de tener luisito enfermedad cardíaca, un ataque Sherra Hemp y un ataque cerebral. El HDL es el colesterol \"huitron\". Un HDL alto está vinculado con un riesgo más bajo de tener enfermedades cardíacas, un ataque al corazón y un ataque cerebral.  Viviana niveles de colesterol ayudan a orozco médico a determinar orozco riesgo de tener un ataque cardíaco o un ataque cerebral.  ¿Cómo puede prevenir el colesterol alto? Un estilo de shasta saludable para el corazón puede ayudarle a prevenir el colesterol alto. Carl estilo de shasta ayuda a reducir orozco riesgo de tener un ataque Sherra Hemp y un ataque cerebral.  · Coma alimentos saludables para el corazón. ? Coma frutas, verduras, cereales integrales (leslie la harina de wagner), frijoles secos y arvejas (chícharos), nueces y semillas, productos de soya (leslie el tofu) y lácteos semidescremados o descremados. ? Reemplace la [place], la margarina y los aceites hidrogenados o parcialmente hidrogenados por aceite de salazar o de canola (colza). (La margarina de aceite de canola sin grasas hidrogenadas -también llamadas grasas trans- es adecuada). ? Reemplace las faiza ramirez por pescado, aves y proteína de soya (leslie el tofu). ? Limite el consumo de alimentos procesados y empaquetados, leslie los chips (leslie lana fritas), las galletas saladas y las galletas dulces. · Manténgase activo. El ejercicio puede mejorar viviana niveles de [place]. Marialuisa por lo menos 30 minutos de ejercicio la mayoría de los días de la [place]. Caminar es luisito buena opción.  Gisel Ortiz también desee hacer otras actividades, leslie correr, nadar, [employer] en bicicleta, jugar al tenis o practicar otros deportes de equipo. · Mantenga un peso saludable. Baje de peso si lo necesita. · No fume. Si necesita ayuda para dejar de fumar, hable con orozco médico sobre programas y medicamentos para dejar de fumar. Estos pueden aumentar shorty probabilidades de dejar el hábito para siempre. ¿Cómo se trata el colesterol alto? La meta de orozco tratamiento es reducir shorty probabilidades de tener un ataque al corazón o un ataque cerebral. La meta no es solamente reducir shorty cifras de colesterol. · Geoff vez pueda hacer cambios de estilo de shasta saludables, leslie comer alimentos saludables, no fumar, adelgazar y [address] · Es posible que tenga que yolanda medicamentos. La atención de seguimiento es luisito parte clave de orozco tratamiento y seguridad. Asegúrese de hacer y acudir a todas las citas, y llame a orozco médico si está teniendo problemas. También es luisito buena idea saber los resultados de shorty exámenes y mantener luisito lista de los medicamentos que andrew. ¿Dónde puede encontrar más información en inglés? Laura Hardy a http://neptali-tristian.info/. Alesha N887 en la búsqueda para aprender más acerca de \"Aprenda sobre el colesterol alto - [ Learning About High Cholesterol ]. \"  Revisado: 22 julio, 2018  Versión del contenido: 12.1  © 5658-2444 Healthwise, Incorporated. Las instrucciones de cuidado fueron adaptadas bajo licencia por [employer] (which disclaims liability or warranty for this information). Si usted tiene [place] afección médica o sobre estas instrucciones, siempre pregunte a orozco profesional de jose. [facility] niega toda garantía o responsabilidad por orozco uso de esta información.

## 2019-09-12 NOTE — PROGRESS NOTES
38 yo male here to follow up on lab results    Elevated glucose, cholesterol panel    Encouraged diet and exercise    I reviewed with the resident the medical history and the resident's findings on the physical examination. I discussed with the resident the patient's diagnosis and concur with the plan.

## 2019-09-12 NOTE — PROGRESS NOTES
HPI       Viktor Hawthorne is a 39 y.o. male who presents for follow up on lab results  Hypertriglyceridemia: . I had started him on Crestor 20 mg QHS. He took it only for 3 days but then stopped because it gave him insomnia. LUQ abdominal pain: now better on omeprazole. Pain is not present  Hyperkalemia: K 5.3. Denies any muscle cramps/weakness or palpitations. Transaminitis: with hepatic steatosis on US, elevated TG. Denies RUQ pain  Elevated glucose: no history of diabetes. Complaints of fatigue. Denies polydipsia, polyphagia or polyuria       PMHx-reviewed:  No past medical history on file. Meds-reviewed:   Current Outpatient Medications   Medication Sig Dispense Refill    atorvastatin (LIPITOR) 20 mg tablet Take 1 Tab by mouth daily. 30 Tab 3    omeprazole (PRILOSEC) 20 mg capsule Take 1 Cap by mouth daily. 30 Cap 0    ibuprofen (MOTRIN) 800 mg tablet Take 1 Tab by mouth every six (6) hours as needed for Pain. 30 Tab 0     Allergies-reviewed:   No Known Allergies    Smoker-reviewed:  Social History     Tobacco Use   Smoking Status Former Smoker    Last attempt to quit: 8/15/2014    Years since quittin.0   Smokeless Tobacco Never Used     ETOH-reviewed:   Social History     Substance and Sexual Activity   Alcohol Use Not Currently     FH-reviewed:   No family history on file. ROS:  Review of Systems   Constitutional: Positive for fatigue. Respiratory: Negative. Cardiovascular: Negative. Gastrointestinal: Negative. Endocrine: Negative. Skin: Negative. Psychiatric/Behavioral: Positive for sleep disturbance.      Physical Exam:  Visit Vitals  /69   Pulse 70   Temp 98 °F (36.7 °C) (Oral)   Resp 16   Ht 5' 5\" (1.651 m)   Wt 172 lb (78 kg)   SpO2 97%   BMI 28.62 kg/m²       Wt Readings from Last 3 Encounters:   19 172 lb (78 kg)   19 172 lb (78 kg)   08/15/19 172 lb (78 kg)     BP Readings from Last 3 Encounters:   19 113/69   19 113/69   08/15/19 123/75      Physical Exam   Constitutional: He appears well-developed and well-nourished. No distress. Cardiovascular: Normal rate and regular rhythm. No murmur heard. Pulmonary/Chest: Effort normal and breath sounds normal. He has no wheezes. Abdominal: Soft. Bowel sounds are normal. He exhibits no distension and no mass. There is no tenderness. There is no guarding. Musculoskeletal: Normal range of motion. He exhibits no edema. Skin: Skin is warm and dry. No rash noted. Assessment     39 y.o. male with:    ICD-10-CM ICD-9-CM    1. Hypertriglyceridemia E78.1 272.1 atorvastatin (LIPITOR) 20 mg tablet      DISCONTINUED: atorvastatin (LIPITOR) 40 mg tablet   2. Hyperkalemia C50.0 481.6 METABOLIC PANEL, COMPREHENSIVE   3. Transaminitis F21.1 266.3 METABOLIC PANEL, COMPREHENSIVE   4. Left upper quadrant pain R10.12 789.02    5. Elevated glucose level U09.78 081.09 METABOLIC PANEL, COMPREHENSIVE              Plan       Orders Placed This Encounter    METABOLIC PANEL, COMPREHENSIVE    DISCONTD: atorvastatin (LIPITOR) 40 mg tablet    atorvastatin (LIPITOR) 20 mg tablet     - Hypertriglyceridemia: changed Crestor to Lipitor. Talked to patient about the importance of diet. Avoid fatty foods and exercise. Will recheck lipid panel in 3 months  - Hyperkalemia: recheck CMP today  - Elevated glucose: will check A1c today, CMP to screen for diabetes. Will see patient in 3 months to see how he's doing     Patient discussed with Dr. Roxanne Bergeron     I have discussed the diagnosis with the patient and the intended plan as seen in the above orders. The patient has received an after-visit summary and questions were answered concerning future plans. I have discussed medication side effects and warnings with the patient as well.     Otilio Johnston MD  Family Medicine Resident  PGY-3

## 2019-09-12 NOTE — PROGRESS NOTES
Chief Complaint   Patient presents with    Abnormal Lab Results       1. Have you been to the ER, urgent care clinic since your last visit? Hospitalized since your last visit? No    2. Have you seen or consulted any other health care providers outside of the [facility] since your last visit? Include any pap smears or colon screening.  No

## 2019-09-13 LAB
ALBUMIN SERPL-MCNC: 4.8 G/DL (ref 3.5–5.5)
ALBUMIN/GLOB SERPL: 1.8 {RATIO} (ref 1.2–2.2)
ALP SERPL-CCNC: 111 IU/L (ref 39–117)
ALT SERPL-CCNC: 62 IU/L (ref 0–44)
AST SERPL-CCNC: 30 IU/L (ref 0–40)
BILIRUB SERPL-MCNC: 0.4 MG/DL (ref 0–1.2)
BUN SERPL-MCNC: 16 MG/DL (ref 6–24)
BUN/CREAT SERPL: 20 (ref 9–20)
CALCIUM SERPL-MCNC: 10.2 MG/DL (ref 8.7–10.2)
CHLORIDE SERPL-SCNC: 98 MMOL/L (ref 96–106)
CO2 SERPL-SCNC: 22 MMOL/L (ref 20–29)
CREAT SERPL-MCNC: 0.8 MG/DL (ref 0.76–1.27)
EST. AVERAGE GLUCOSE BLD GHB EST-MCNC: 252 MG/DL
FERRITIN SERPL-MCNC: 190 NG/ML (ref 30–400)
GLOBULIN SER CALC-MCNC: 2.6 G/DL (ref 1.5–4.5)
GLUCOSE SERPL-MCNC: 341 MG/DL (ref 65–99)
HBA1C MFR BLD: 10.4 % (ref 4.8–5.6)
IRON SATN MFR SERPL: 28 % (ref 15–55)
IRON SERPL-MCNC: 109 UG/DL (ref 38–169)
POTASSIUM SERPL-SCNC: 4.9 MMOL/L (ref 3.5–5.2)
PROT SERPL-MCNC: 7.4 G/DL (ref 6–8.5)
SODIUM SERPL-SCNC: 139 MMOL/L (ref 134–144)
TIBC SERPL-MCNC: 394 UG/DL (ref 250–450)
UIBC SERPL-MCNC: 285 UG/DL (ref 111–343)

## 2019-09-14 ENCOUNTER — TELEPHONE (OUTPATIENT)
Dept: FAMILY MEDICINE CLINIC | Age: 44
End: 2019-09-14

## 2019-09-14 NOTE — PROGRESS NOTES
Called patient about lab results. Glucose elevated for the second time with A1c high as well at 10.4. Patient is now newly diagnosed with Diabetes. I explained lab results to him and told him to make appointment to discuss it further in the office. Will start treatment in the office as well as this is complete new to the patient. Told him to call and make appointment. In the mean time, start working on diet and exercise: stop al l sugary drinks like sodas and juices, increase vegetable intake. Exercise at least 150 min per week. Patient agreed with plan.      Maren Paredes MD  Family Medicine Resident  PGY-3

## 2019-09-20 ENCOUNTER — OFFICE VISIT (OUTPATIENT)
Dept: FAMILY MEDICINE CLINIC | Age: 44
End: 2019-09-20

## 2019-09-20 VITALS
BODY MASS INDEX: 28.99 KG/M2 | WEIGHT: 174 LBS | TEMPERATURE: 98.2 F | SYSTOLIC BLOOD PRESSURE: 112 MMHG | RESPIRATION RATE: 16 BRPM | DIASTOLIC BLOOD PRESSURE: 68 MMHG | HEART RATE: 62 BPM | OXYGEN SATURATION: 98 % | HEIGHT: 65 IN

## 2019-09-20 DIAGNOSIS — Z23 ENCOUNTER FOR IMMUNIZATION: ICD-10-CM

## 2019-09-20 DIAGNOSIS — E11.65 TYPE 2 DIABETES MELLITUS WITH HYPERGLYCEMIA, WITHOUT LONG-TERM CURRENT USE OF INSULIN (HCC): Primary | ICD-10-CM

## 2019-09-20 RX ORDER — ROSUVASTATIN CALCIUM 20 MG/1
TABLET, COATED ORAL
Refills: 3 | COMMUNITY
Start: 2019-08-16

## 2019-09-20 RX ORDER — METFORMIN HYDROCHLORIDE 500 MG/1
500 TABLET ORAL
Qty: 60 TAB | Refills: 3 | Status: SHIPPED | OUTPATIENT
Start: 2019-09-20

## 2019-09-20 NOTE — PROGRESS NOTES
HPI       Jasmin Elizabeth is a 39 y.o. male who presents for newly diagnosed diabetes   A1c 10.4. Patient denies polyuria, polydipsia or polyphagia. His only complaint is feeling fatigued  He is concerned regarding this new diagnosis and the lifestyle changes he needs to do  Diet: he eats a lot of bread, rice, tortillas, coffee with sugar  Exercise: denies exercising   He is on Lipitor for hypertriglyceridemia and ASCVD risk of 2.4% indicating need for moderate intensity statin  He denies any previous history of diabetes on him or family      PMHx-reviewed:  No past medical history on file. Meds-reviewed:   Current Outpatient Medications   Medication Sig Dispense Refill    rosuvastatin (CRESTOR) 20 mg tablet TOME VERONICA TABLETA TODOS LOS D? AS AT NIGHT  3    metFORMIN (GLUCOPHAGE) 500 mg tablet Take 1 Tab by mouth daily (with breakfast). 60 Tab 3    omeprazole (PRILOSEC) 20 mg capsule Take 1 Cap by mouth daily. 30 Cap 0    atorvastatin (LIPITOR) 20 mg tablet Take 1 Tab by mouth daily. 30 Tab 3    ibuprofen (MOTRIN) 800 mg tablet Take 1 Tab by mouth every six (6) hours as needed for Pain. 30 Tab 0     Allergies-reviewed:   No Known Allergies    Smoker-reviewed:  Social History     Tobacco Use   Smoking Status Former Smoker    Last attempt to quit: 8/15/2014    Years since quittin.1   Smokeless Tobacco Never Used     ETOH-reviewed:   Social History     Substance and Sexual Activity   Alcohol Use Not Currently     FH-reviewed:   No family history on file. ROS:  Review of Systems   Constitutional: Positive for fatigue. Respiratory: Negative. Cardiovascular: Negative. Gastrointestinal: Negative. Endocrine: Negative. Genitourinary: Negative. Skin: Negative.       Physical Exam:  Visit Vitals  /68 (BP 1 Location: Left arm, BP Patient Position: Sitting)   Pulse 62   Temp 98.2 °F (36.8 °C) (Oral)   Resp 16   Ht 5' 5\" (1.651 m)   Wt 174 lb (78.9 kg)   SpO2 98%   BMI 28.96 kg/m²       Wt Readings from Last 3 Encounters:   09/20/19 174 lb (78.9 kg)   09/12/19 172 lb (78 kg)   09/12/19 172 lb (78 kg)     BP Readings from Last 3 Encounters:   09/20/19 112/68   09/12/19 113/69   09/12/19 113/69      Physical Exam   Constitutional: He appears well-developed and well-nourished. No distress. Cardiovascular: Normal rate and regular rhythm. No murmur heard. Pulmonary/Chest: Effort normal and breath sounds normal. He has no wheezes. Abdominal: Soft. Bowel sounds are normal. There is no tenderness. Musculoskeletal: Normal range of motion. He exhibits no edema. Right foot: There is normal range of motion and no deformity. Left foot: There is normal range of motion and no deformity. Feet:   Right Foot:   Protective Sensation: 10 sites tested. 10 sites sensed. Skin Integrity: Positive for callus and dry skin. Negative for ulcer, blister, skin breakdown, erythema or warmth. Left Foot:   Protective Sensation: 10 sites tested. 10 sites sensed. Skin Integrity: Positive for callus and dry skin. Negative for ulcer, blister, skin breakdown, erythema or warmth. Skin: Skin is warm and dry. No rash noted. Assessment     39 y.o. male with:    ICD-10-CM ICD-9-CM    1. Type 2 diabetes mellitus with hyperglycemia, without long-term current use of insulin ([facility], located within [facility]) E11.65 250.00  DIABETES FOOT EXAM     790.29 MICROALBUMIN, UR, RAND W/ MICROALB/CREAT RATIO      REFERRAL TO DIABETIC EDUCATION      metFORMIN (GLUCOPHAGE) 500 mg tablet   2.  Encounter for immunization Z23 V03.89 INFLUENZA VIRUS VAC QUAD,SPLIT,PRESV FREE SYRINGE IM      W[place] IMMUNIZ ADMIN,1 SINGLE/COMB VAC/TOXOID              Plan       Orders Placed This Encounter    [place] IMMUNIZ ADMIN,1 SINGLE/COMB VAC/TOXOID    INFLUENZA VIRUS VACCINE QUADRIVALENT, PRESERVATIVE FREE SYRINGE (04165)    MICROALBUMIN, UR, RAND W/ MICROALB/CREAT RATIO    REFERRAL TO DIABETIC EDUCATION - [facility] DIABETES FOOT EXAM    metFORMIN (GLUCOPHAGE) 500 mg tablet     Newly diagnosed diabetes. Discussed at length the meaning of this new diagnosis, implications, lifestyle changes, complications. Foot exam done and protein/cr ratio ordered today. Will refer him to diabetes education for further education. Book with all this information provided in Slovak  Patient refused insulin treatment. Will start him on Metformin 500 mg daily for 1 week and then 500 mg BID for another week. Will see him at this point to see how he is tolerating treatment and will consider starting another medicine at that point and increasing metformin dosage   Counseled patient to exercise at least 150 min per week and to decrease the amount of carbohydrates he is consuming. Encouraged to stop drinking all sugary drinks and to avoid adding sugar to beverages. S/p Flu vaccine     Patient discussed with Dr. Marguerite Merhcant    I have discussed the diagnosis with the patient and the intended plan as seen in the above orders. The patient has received an after-visit summary and questions were answered concerning future plans. I have discussed medication side effects and warnings with the patient as well.     Kiara Cazares MD  Family Medicine Resident  PGY-3

## 2019-09-20 NOTE — PATIENT INSTRUCTIONS
Medicamentos no insulínicos para la diabetes de tipo 2: Instrucciones de cuidado - [ Seretha Liloman for Type 2 Diabetes: Care Instructions ]  Instrucciones de cuidado    Existen diferentes tipos de medicamentos no insulínicos para la diabetes. Cada tipo funciona de un modo distinto. Jagjit todos ellos le ayudan a controlar el azúcar en la cameron. Algunos tipos ayudan a que el organismo produzca insulina para reducir el azúcar en la cameron. Otros reducen la cantidad de insulina que necesita el organismo. Algunos pueden retrasar la velocidad con la que el cuerpo digiere los azúcares. Y algunos pueden eliminar el exceso de glucosa a través de la [place]. · Inhibidores de la darrion-glucosidasa. Estos evitan que los almidones se descompongan. [place] significa que reducen la cantidad de glucosa que se absorbe cuando usted come. No ayudan al organismo a producir más insulina. Por lo tanto, no provocarán azúcar baja en la cameron a menos que se usen con otros medicamentos para la diabetes. Incluyen la acarbosa y el miglitol. · Inhibidores de la DPP-4. Estos ayudan al organismo a elevar el nivel de insulina después de comer. También ayudan al cuerpo a producir ethel cantidad de luisito hormona que eleva el azúcar en la [place]. Incluyen la linagliptina, la saxagliptina y la sitagliptina. · Hormonas incretinas (agonistas del receptor de GLP-1). El organismo produce luisito proteína que puede elevar orozco nivel de [place]. También puede bajar el azúcar en la cameron y hacer que sienta menos [place]. Usted puede inyectarse hormonas que funcionan de la [place]. Estas incluyen la exenatida y la liraglutida. · Meglitinidas. Estas ayudan al organismo a liberar insulina. También ayudan a retrasar cómo digiere los azúcares el organismo. Así que pueden evitar que el azúcar en la cameron se eleve demasiado rápido después de comer. Incluyen la nateglinida y la repaglinida. · Metformina.  Esta reduce la cantidad de glucosa que produce el hígado. Y le ayuda a responder mejor a la insulina. También baja la cantidad de azúcar almacenada que libera el hígado cuando no está comiendo. · Inhibidores del SGLT2. Estos ayudan a eliminar el exceso de glucosa a través de la [place]. También podrían ayudar a [employer] a bajar de peso. Incluyen la canagliflozina, la dapagliflozina y la empagliflozina. · Sulfonilureas. Estas ayudan al organismo a liberar más insulina. Algunas funcionan por muchas horas. Pueden provocar azúcar baja en la cameron si usted no come leslie esperaba. Incluyen la glipizida y la gliburida. · Tiazolidinedionas. Estas reducen la cantidad de glucosa en la cameron. También le ayudan a responder mejor a la insulina. Hobert Aniceto y la rosiglitazona. Geoff vez tenga que yolanda más de un medicamento para la diabetes. Dos o más medicamentos podrían funcionar mejor para bajar el nivel de azúcar en la cameron que un medicamento solo. La atención de seguimiento es luisito parte clave de orozco tratamiento y seguridad. Asegúrese de hacer y acudir a todas las citas, y llame a orozco médico si está teniendo problemas. También es luisito buena idea saber los resultados de shorty exámenes y mantener luisito lista de los medicamentos que andrew. ¿Cómo puede cuidarse en el hogar? · Siga luisito dieta saludable. Marialuisa algo de ejercicio todos los jorge. [place] puede ayudarle a reducir la cantidad de medicamentos que necesita. · No tome otros medicamentos recetados o de venta mary, vitaminas, productos herbarios o suplementos sin consultar jamie a orozco médico. Algunos medicamentos para la diabetes de tipo 2 pueden causar problemas con otros medicamentos o suplementos. · Dígale a orozco médico si tiene planes de quedar embarazada. Algunos de [place] no son seguros para las [address] · [place] con los medicamentos. [employer] medicamentos exactamente leslie le fueron recetados.  Las meglitinidas y las sulfonilureas pueden hacer que el azúcar en la [place] baje mucho. Llame a orozco médico si karolina estar teniendo un problema con orozco medicamento. · Revísese los niveles de azúcar en la cameron con frecuencia. Puede usar un monitor de glucosa. Llevar un registro puede ayudarle a saber cómo ciertos alimentos, actividades y medicamentos afectan orozco azúcar en la cameron. Y puede ayudarle a prevenir que el azúcar en la cameron baje a niveles peligrosos. ¿Cuándo debe pedir ayuda? Llame al 911 en cualquier momento que considere que necesita atención de [place]. Por ejemplo, llame si:    · Se desmayó (perdió el conocimiento).     · Está confuso o no puede pensar con claridad.     · Orozco nivel de azúcar en la cameron es muy alto o muy bajo.    Preste especial atención a los cambios en orozco jose y asegúrese de comunicarse con orozco médico si:    · Orozco nivel de azúcar en la cameron permanece fuera de los límites ideales que el médico fernandez establecido para usted.     · Tiene cualquier problema. ¿Dónde puede encontrar más información en inglés? Magalie Kin a http://neptali-tristian.info/. Escriba H153 en la búsqueda para aprender más acerca de \"Medicamentos no insulínicos para la diabetes de tipo 2: Instrucciones de cuidado - [ Kimberlee Wilde for Type 2 Diabetes: Care Instructions ]. \"  Revisado: 25 julio, 2018  Versión del contenido: 12.1  © 2040-4560 Healthwise, Incorporated. Las instrucciones de cuidado fueron adaptadas bajo licencia por Good Help Connections (which disclaims liability or warranty for this information). Si usted tiene [place] afección médica o sobre estas instrucciones, siempre pregunte a orozco profesional de jose. Healthwise, Incorporated niega toda garantía o responsabilidad por orozco uso de esta información. Jose de los pies en personas con diabetes: Instrucciones de cuidado - [ Diabetes Foot Health: Care Instructions ]  Instrucciones de cuidado    Cuando usted tiene diabetes, shorty pies necesitan más cuidado y [place].  La diabetes puede dañar las terminaciones nerviosas y los vasos sanguíneos de los pies y provocar que usted no se dé cuenta de que shorty pies están lesionados. La diabetes también limita la capacidad del cuerpo para atacar las infecciones y llevar la cameron a las zonas que la requieren. Luisito herida ethel en el pie podría convertirse en Lucianne Beech o luisito infección grave. Usted puede prevenir la mayoría de estos problemas teniendo un buen cuidado de shorty pies. Cuidar shorty pies puede ser rápido y fácil. La mayor parte del cuidado puede hacerse cuando usted se baña o se prepara para ir a dormir. La atención de seguimiento es luisito parte clave de orozco tratamiento y seguridad. Asegúrese de hacer y acudir a todas las citas, y llame a orozco médico si está teniendo problemas. También es luisito buena idea saber los resultados de shorty exámenes y mantener luisito lista de los medicamentos que andrew. ¿Cómo puede cuidarse en el hogar? · Mantenga orozco azúcar en la cameron cerca del nivel normal observando qué y cuánto come, monitoreando orozco nivel de azúcar en la cameron, tomando los medicamentos si se los elias recetado y haciendo ejercicio con regularidad. · No fume. Fumar afecta el flujo de cameron y puede [employer] problemas de los pies. Si necesita ayuda para dejar de fumar, hable con orozco médico [employer] y medicamentos para dejar de fumar. Pueden aumentar shorty probabilidades de dejar el hábito para siempre. · Consuma luisito dieta baja en grasas. Un consumo alto de grasa puede provocar luisito acumulación en los vasos sanguíneos y reducir el flujo de [place]. · Inspeccione shorty pies a diario para michael si tienen ampollas, cortaduras, grietas o llagas. Si no puede michael rosette, utilice un jeniffer o pídale a alguien [facility]. · Cuídese los pies:  ? [facility] días. Use agua tibia (no caliente). Revise la temperatura del agua con orozco nel u otra parte del cuerpo, no con shorty pies. ? Seque rosette shorty pies. Séquelos con toques suaves de toalla.  No frote demasiado la piel de los pies. Seque rosette entre los dedos de los pies. Si la piel de los pies [place], pueden crecer bacterias u hongos, que pueden provocar luisito infección. ? Mantenga orozco piel suave. Use crema humectante para la piel para mantener la piel de los pies [place] y prevenir callosidades y grietas. Jagjit no se ponga crema entre los dedos de los pies, y deje el uso de cualquier crema que le cause salpullido. ? Limpie con cuidado debajo de las uñas de los pies. No utilice objetos cortantes para limpiar debajo de las uñas de los pies. Use el extremo sin sarah de luisito lima para uñas u otro instrumento redondeado. ? Recorte y lime las uñas de los pies en forma recta para prevenir las uñas encarnadas (enterradas). Use un cortaúñas, no tijeras. Use luisito lima de esmeril para [employer]. · Cámbiese los calcetines a diario. Las medias sin costura son las mejores, porque las costuras suelen rozar los pies. Puede encontrar calcetines en catálogos especializados para personas con diabetes. · Revise shorty zapatos todos los días en busca de cosas leslie piedrecillas o revestimiento rasgado del zapato, que le puedan causar ampollas o llagas. · Cómprese zapatos que le queden rosette:  ? Busque zapatos que tengan suficiente espacio aledgaror [employer] dedos. [place] ayuda a prevenir juanetes y ampollas. ? Pruébese los zapatos con el tipo de calcetín que usará de manera habitual con ellos. ? Evite los zapatos plásticos. Éstos podrían rozar shorty pies y causar ampollas. Los zapatos de buena calidad deben estar hechos de materiales que martín flexibles y dejen circular el aire, leslie cuero o huong. ? [employer] zapatos nuevos poco a poco usándolos no más de luisito hora al día oli varios días. Tómese más tiempo para revisar shorty pies para zonas enrojecidas, ampollas u otros problemas después de usar unos zapatos nuevos. · No camine descalzo. No use sandalias ni zapatos con suelas muy delgadas. Las suelas delgadas se rompen con facilidad. Tampoco protegen shorty pies del pavimento caliente o de las temperaturas frías. · Pídale a orozco médico que le revise los pies en cada consulta. Si tiene algún [employer], consulte a orozco médico. No intente tratar un problema del pie con arnoldo caseros. Los arnoldo o tratamientos caseros de venta mary (leslie los eliminadores de callos) pueden ser dañinos. · Siempre busque un tratamiento temprano para los problemas de los pies. Luisito irritación ethel puede causar un problema importante si no se trata pronto y de [place]. ¿Cuándo debe pedir ayuda? Llame a orozco médico ahora mismo o busque atención médica inmediata si:    · Presenta luisito llaga en el pie, luisito úlcera o luisito grieta en la piel que no sanan después de 4 días, callos o callosidades que sangran, o luisito uña encarnada.     · Tiene zonas de la piel de color azulado o negruzco, que pueden significar moretones o problemas de flujo de [place].     · Tiene piel descamada o ampollas diminutas entre los dedos de los pies, o piel agrietada o supurante.     · Tiene fiebre por más de 24 horas y luisito llaga en el pie.     · Siente nuevo entumecimiento u hormigueo en los pies que no desaparece después de  los pies o cambiar de posición.     · Tiene luisito hinchazón inexplicable o inusual en el pie o el tobillo.    Preste especial atención a los cambios en orozco jose y asegúrese de comunicarse con orozco médico si:    · No puede cuidarse los pies de [place] apropiada. ¿Dónde puede encontrar más información en inglés? Slick Garduno a http://neptali-tristian.info/. Escriba A739 en la búsqueda para aprender más acerca de \"Jose de los pies en personas con diabetes: Instrucciones de cuidado - [ Diabetes Foot Health: Care Instructions ]. \"  Revisado: 25 julio, 2018  Versión del contenido: 12.1  © 2000-2813 Healthwise, [employer].  Las instrucciones de cuidado fueron adaptadas bajo licencia por Good Help Connections (which disclaims liability or warranty for this information). Si usted tiene [place] afección médica o sobre estas instrucciones, siempre pregunte a orozco profesional de jose. [facility]orporated niega toda garantía o responsabilidad por orozco uso de esta información. Diabetes tipo 2: Instrucciones de cuidado - [ Type 2 Diabetes: Care Instructions ]  Instrucciones de cuidado    La diabetes tipo 2 es luisito enfermedad que se desarrolla cuando los tejidos del organismo no pueden utilizar la insulina de [facility]. Con el tiempo, el páncreas no puede producir suficiente insulina. La insulina es luisito hormona que ayuda a las células del cuerpo a utilizar el azúcar (glucosa) para obtener energía. También ayuda al cuerpo a almacenar el azúcar adicional en las células de los músculos, la grasa y [place]. Sin la insulina, el azúcar no puede entrar en las células para hacer orozco trabajo. En cambio, se queda en [place] [employer]. [place] puede causar [employer] de azúcar en la cameron. Luisito persona tiene diabetes cuando orozco nivel de azúcar en la cameron permanece demasiado alto, oli demasiado [place]. Con el tiempo, la diabetes puede provocar enfermedades del corazón, los vasos sanguíneos, los nervios, los riñones y los ojos. Es posible que pueda controlar el azúcar en la cameron al bajar de [place], comer luisito dieta saludable y hacer ejercicio a diario. También podría tener que usar insulina u otros medicamentos para la diabetes. La atención de seguimiento es luisito parte clave de orozco tratamiento y seguridad. Asegúrese de hacer y acudir a todas las citas, y llame a orozco médico si está teniendo problemas. También es luisito buena idea saber los resultados de shorty exámenes y mantener luisito lista de los medicamentos que andrew. ¿Cómo puede cuidarse en el hogar? · Mantenga el azúcar en la cameron en el nivel ideal (que usted fija con orozco médico). ? Siga luisito buena dieta que distribuya los carbohidratos a lo sierra del día.  Los carbohidratos, que constituyen la principal berta de energía del organismo, afectan el azúcar en la cameron en mayor medida que cualquier otro nutriente. Los carbohidratos están presentes en las frutas, las verduras, la [place] y el yogur. También [employer], los cereales, las verduras leslie las lana y el maíz (elote), y en los alimentos [place], leslie los caramelos y los pasteles. ? Trate de hacer 30 minutos de ejercicio la [address] o, de preferencia, todos los días de la [place]. Caminar es luisito buena opción. Es posible que también quiera hacer otras actividades, leslie correr, nadar, [employer], o jugar al tenis o deportes de equipo. Si orozco médico lo aprueba, sveta ejercicios de fortalecimiento muscular al menos 2 veces por semana. ? [employer] fueron recetados. Llame a orozco médico si karolina estar teniendo un problema con orozco medicamento. Recibirá más [employer] medicamentos específicos recetados por orozco médico.  · Revise el azúcar en orozco cameron con tanta frecuencia leslie lo recomiende orozco médico. Es importante seguir con atención cualquier síntoma que tenga, leslie bajo nivel de azúcar en la cameron, y cualquier [employer], la dieta o el uso de [place]. · Hable con orozco médico antes de empezar a yolanda [facility]. La aspirina puede ayudar a determinadas personas a reducir orozco riesgo de tener un ataque cardíaco o un ataque cerebral. Jagjit yolanda aspirina no es adecuado para todo el TRENGEREID, debido a que puede causar sangrado grave. · No fume. Si necesita ayuda para dejar de fumar, hable con orozco médico sobre programas y medicamentos para dejar de fumar. Estos pueden aumentar shorty probabilidades de dejar el hábito para siempre. · Mantenga el colesterol y la presión arterial a niveles normales. [place]amae [place] necesite yolanda masoud o más medicamentos para alcanzar shorty objetivos. Tómelos exactamente de acuerdo con las indicaciones.  No deje de yolanda ni cambie un medicamento sin hablar antes con orozco médico.  ¿Cuándo debe pedir ayuda? Llame al 911 en cualquier momento que considere que necesita atención de [place]. Por ejemplo, llame si:    · Se desmayó (perdió el conocimiento), o de repente se siente muy somnoliento (con sueño) o confuso. (Podría tener un nivel muy bajo de azúcar en la cameron).    Llame a orozco médico ahora mismo o busque atención médica inmediata si:    · Orozco nivel de azúcar en la cameron es de 300 mg/dL o es más alto que el nivel que orozco médico ha establecido para usted.     · Presenta síntomas de un bajo nivel de azúcar en la cameron, tales leslie:  ? Sudoración. ? Sentirse nervioso, tembloroso y débil. ? Hambre extrema y náuseas leves. ? Nevelyn Kirks y dolor de [place]. ? Minetta Bence. ? Confusión.    Preste especial atención a los cambios en orozco jose y asegúrese de comunicarse con orozco médico si:    · A menudo tiene problemas para controlar el azúcar en la cameron.     · Tiene síntomas de problemas a sierra plazo causados por la diabetes, tales leslie:  ? Cambios nuevos en la visión. ? [employer], entumecimiento u hormigueo en las tomy o los pies. ? Problemas en la piel. ¿Dónde puede encontrar más información en inglés? Frida Esteves a http://neptali-tritsian.info/. Escriba C553 en la búsqueda para aprender más acerca de \"Diabetes tipo 2: Instrucciones de cuidado - [ Type 2 Diabetes: Care Instructions ]. \"  Revisado: 16 edgardo, 2019  Versión del contenido: 12.2  © 4980-0468 Healthwise, Incorporated. Las instrucciones de cuidado fueron adaptadas bajo licencia por Good Help Connections (which disclaims liability or warranty for this information). Si usted tiene [place] afección médica o sobre estas instrucciones, siempre pregunte a orozco profesional de jose. [facility] Incorporated niega toda garantía o responsabilidad por orozco uso de esta información.

## 2019-09-21 LAB
ALBUMIN/CREAT UR: 6.5 MG/G CREAT (ref 0–30)
CREAT UR-MCNC: 180 MG/DL
MICROALBUMIN UR-MCNC: 11.7 UG/ML

## 2019-09-28 NOTE — PROGRESS NOTES
I reviewed with the resident the medical history and the resident's findings on the physical examination. I discussed with the resident the patient's diagnosis and concur with the plan. `

## 2019-10-07 DIAGNOSIS — R10.12 LEFT UPPER QUADRANT PAIN: ICD-10-CM

## 2019-10-08 RX ORDER — OMEPRAZOLE 20 MG/1
20 CAPSULE, DELAYED RELEASE ORAL DAILY
Qty: 30 CAP | Refills: 0 | Status: SHIPPED | OUTPATIENT
Start: 2019-10-08

## 2022-12-06 ENCOUNTER — APPOINTMENT (OUTPATIENT)
Dept: CT IMAGING | Age: 47
End: 2022-12-06
Attending: EMERGENCY MEDICINE

## 2022-12-06 ENCOUNTER — HOSPITAL ENCOUNTER (EMERGENCY)
Age: 47
Discharge: HOME | End: 2022-12-06
Attending: EMERGENCY MEDICINE

## 2022-12-06 VITALS
RESPIRATION RATE: 16 BRPM | OXYGEN SATURATION: 98 % | HEART RATE: 115 BPM | WEIGHT: 167.99 LBS | BODY MASS INDEX: 27.99 KG/M2 | SYSTOLIC BLOOD PRESSURE: 156 MMHG | TEMPERATURE: 98.3 F | DIASTOLIC BLOOD PRESSURE: 94 MMHG | HEIGHT: 65 IN

## 2022-12-06 DIAGNOSIS — K61.1 PERIRECTAL ABSCESS: Primary | ICD-10-CM

## 2022-12-06 LAB
ALBUMIN SERPL-MCNC: 4.1 G/DL (ref 3.5–5)
ALBUMIN/GLOB SERPL: 0.9 {RATIO} (ref 1.1–2.2)
ALP SERPL-CCNC: 124 U/L (ref 45–117)
ALT SERPL-CCNC: 31 U/L (ref 12–78)
ANION GAP SERPL CALC-SCNC: 4 MMOL/L (ref 5–15)
APPEARANCE UR: CLEAR
AST SERPL-CCNC: 13 U/L (ref 15–37)
BACTERIA URNS QL MICRO: NEGATIVE /HPF
BASOPHILS # BLD: 0 K/UL (ref 0–0.1)
BASOPHILS NFR BLD: 0 % (ref 0–1)
BILIRUB SERPL-MCNC: 0.4 MG/DL (ref 0.2–1)
BILIRUB UR QL: NEGATIVE
BUN SERPL-MCNC: 17 MG/DL (ref 6–20)
BUN/CREAT SERPL: 16 (ref 12–20)
CALCIUM SERPL-MCNC: 9.6 MG/DL (ref 8.5–10.1)
CHLORIDE SERPL-SCNC: 104 MMOL/L (ref 97–108)
CO2 SERPL-SCNC: 30 MMOL/L (ref 21–32)
COLOR UR: ABNORMAL
CREAT SERPL-MCNC: 1.05 MG/DL (ref 0.7–1.3)
DIFFERENTIAL METHOD BLD: ABNORMAL
EOSINOPHIL # BLD: 0.1 K/UL (ref 0–0.4)
EOSINOPHIL NFR BLD: 0 % (ref 0–7)
EPITH CASTS URNS QL MICRO: ABNORMAL /LPF
ERYTHROCYTE [DISTWIDTH] IN BLOOD BY AUTOMATED COUNT: 12.4 % (ref 11.5–14.5)
GLOBULIN SER CALC-MCNC: 4.5 G/DL (ref 2–4)
GLUCOSE SERPL-MCNC: 262 MG/DL (ref 65–100)
GLUCOSE UR STRIP.AUTO-MCNC: 500 MG/DL
HCT VFR BLD AUTO: 40.5 % (ref 36.6–50.3)
HGB BLD-MCNC: 14 G/DL (ref 12.1–17)
HGB UR QL STRIP: NEGATIVE
HYALINE CASTS URNS QL MICRO: ABNORMAL /LPF (ref 0–2)
IMM GRANULOCYTES # BLD AUTO: 0 K/UL (ref 0–0.04)
IMM GRANULOCYTES NFR BLD AUTO: 0 % (ref 0–0.5)
KETONES UR QL STRIP.AUTO: 15 MG/DL
LEUKOCYTE ESTERASE UR QL STRIP.AUTO: NEGATIVE
LIPASE SERPL-CCNC: 107 U/L (ref 73–393)
LYMPHOCYTES # BLD: 2.4 K/UL (ref 0.8–3.5)
LYMPHOCYTES NFR BLD: 20 % (ref 12–49)
MCH RBC QN AUTO: 30.5 PG (ref 26–34)
MCHC RBC AUTO-ENTMCNC: 34.6 G/DL (ref 30–36.5)
MCV RBC AUTO: 88.2 FL (ref 80–99)
MONOCYTES # BLD: 0.9 K/UL (ref 0–1)
MONOCYTES NFR BLD: 8 % (ref 5–13)
NEUTS SEG # BLD: 8.4 K/UL (ref 1.8–8)
NEUTS SEG NFR BLD: 72 % (ref 32–75)
NITRITE UR QL STRIP.AUTO: NEGATIVE
NRBC # BLD: 0 K/UL (ref 0–0.01)
NRBC BLD-RTO: 0 PER 100 WBC
PH UR STRIP: 6.5 [PH] (ref 5–8)
PLATELET # BLD AUTO: 328 K/UL (ref 150–400)
PMV BLD AUTO: 10.5 FL (ref 8.9–12.9)
POTASSIUM SERPL-SCNC: 4 MMOL/L (ref 3.5–5.1)
PROT SERPL-MCNC: 8.6 G/DL (ref 6.4–8.2)
PROT UR STRIP-MCNC: NEGATIVE MG/DL
RBC # BLD AUTO: 4.59 M/UL (ref 4.1–5.7)
RBC #/AREA URNS HPF: ABNORMAL /HPF (ref 0–5)
SODIUM SERPL-SCNC: 138 MMOL/L (ref 136–145)
SP GR UR REFRACTOMETRY: 1.01 (ref 1–1.03)
UR CULT HOLD, URHOLD: NORMAL
UROBILINOGEN UR QL STRIP.AUTO: 1 EU/DL (ref 0.2–1)
WBC # BLD AUTO: 11.8 K/UL (ref 4.1–11.1)
WBC URNS QL MICRO: ABNORMAL /HPF (ref 0–4)

## 2022-12-06 PROCEDURE — 85025 COMPLETE CBC W/AUTO DIFF WBC: CPT

## 2022-12-06 PROCEDURE — 81001 URINALYSIS AUTO W/SCOPE: CPT

## 2022-12-06 PROCEDURE — 74177 CT ABD & PELVIS W/CONTRAST: CPT

## 2022-12-06 PROCEDURE — 74011000636 HC RX REV CODE- 636: Performed by: EMERGENCY MEDICINE

## 2022-12-06 PROCEDURE — 74011250637 HC RX REV CODE- 250/637: Performed by: EMERGENCY MEDICINE

## 2022-12-06 PROCEDURE — 80053 COMPREHEN METABOLIC PANEL: CPT

## 2022-12-06 PROCEDURE — 36415 COLL VENOUS BLD VENIPUNCTURE: CPT

## 2022-12-06 PROCEDURE — 99285 EMERGENCY DEPT VISIT HI MDM: CPT

## 2022-12-06 PROCEDURE — 83690 ASSAY OF LIPASE: CPT

## 2022-12-06 RX ORDER — AMOXICILLIN AND CLAVULANATE POTASSIUM 875; 125 MG/1; MG/1
1 TABLET, FILM COATED ORAL
Status: COMPLETED | OUTPATIENT
Start: 2022-12-06 | End: 2022-12-06

## 2022-12-06 RX ORDER — AMOXICILLIN AND CLAVULANATE POTASSIUM 875; 125 MG/1; MG/1
1 TABLET, FILM COATED ORAL 2 TIMES DAILY
Qty: 19 TABLET | Refills: 0 | Status: SHIPPED | OUTPATIENT
Start: 2022-12-06 | End: 2022-12-16

## 2022-12-06 RX ORDER — VANCOMYCIN 2 GRAM/500 ML IN 0.9 % SODIUM CHLORIDE INTRAVENOUS
2000 ONCE
Status: DISCONTINUED | OUTPATIENT
Start: 2022-12-06 | End: 2022-12-06

## 2022-12-06 RX ADMIN — IOPAMIDOL 100 ML: 755 INJECTION, SOLUTION INTRAVENOUS at 21:46

## 2022-12-06 RX ADMIN — AMOXICILLIN AND CLAVULANATE POTASSIUM 1 TABLET: 875; 125 TABLET, FILM COATED ORAL at 23:16

## 2022-12-07 NOTE — ED PROVIDER NOTES
25-year-old male with a history of DM, HLD, presents to the emergency department noting a 2-week history of pain in his far lower back and rectal area. He states that his bowel movements have been normal and not constipated. No blood in stool. He has not noted any swelling or bulges down in his groin area or around his rectal area but states that the pain feels deeper. No known history of hemorrhoids. No urinary symptoms. He states that he has felt like he has a fever at night but has not checked his temperature. He denies any nausea or vomiting or any other medical concerns. The history is provided by the patient. The history is limited by a language barrier. A  was used. Back Pain   Associated symptoms include a fever. Pertinent negatives include no chest pain, no numbness, no headaches, no abdominal pain, no dysuria and no weakness. Anal Pain   Associated symptoms include a fever and rectal pain. Pertinent negatives include no abdominal pain, no diarrhea, no nausea, no vomiting, no hematuria, no chest pain, no headaches, no coughing and no rash. No past medical history on file. Past Surgical History:   Procedure Laterality Date    HX HERNIA REPAIR           No family history on file.     Social History     Socioeconomic History    Marital status: SINGLE     Spouse name: Not on file    Number of children: Not on file    Years of education: Not on file    Highest education level: Not on file   Occupational History    Not on file   Tobacco Use    Smoking status: Former     Types: Cigarettes     Quit date: 8/15/2014     Years since quittin.3    Smokeless tobacco: Never   Substance and Sexual Activity    Alcohol use: Not Currently    Drug use: Not on file    Sexual activity: Not on file   Other Topics Concern    Not on file   Social History Narrative    Not on file     Social Determinants of Health     Financial Resource Strain: Not on file   Food Insecurity: Not on file Transportation Needs: Not on file   Physical Activity: Not on file   Stress: Not on file   Social Connections: Not on file   Intimate Partner Violence: Not on file   Housing Stability: Not on file         ALLERGIES: Patient has no known allergies. Review of Systems   Constitutional:  Positive for activity change and fever. Negative for appetite change and chills. HENT:  Negative for congestion, rhinorrhea, sinus pain, sneezing and sore throat. Eyes:  Negative for photophobia and visual disturbance. Respiratory:  Negative for cough and shortness of breath. Cardiovascular:  Negative for chest pain. Gastrointestinal:  Positive for rectal pain. Negative for abdominal pain, anal bleeding, blood in stool, constipation, diarrhea, nausea and vomiting. Genitourinary:  Negative for difficulty urinating, dysuria, flank pain, hematuria, penile pain and testicular pain. Musculoskeletal:  Positive for back pain. Negative for arthralgias, myalgias and neck pain. Skin:  Negative for rash and wound. Neurological:  Negative for syncope, weakness, light-headedness, numbness and headaches. Psychiatric/Behavioral:  Negative for self-injury and suicidal ideas. All other systems reviewed and are negative. Vitals:    12/06/22 1921   BP: (!) 156/94   Pulse: (!) 115   Resp: 16   Temp: 98.3 °F (36.8 °C)   SpO2: 98%   Weight: 76.2 kg (167 lb 15.9 oz)   Height: 5' 5\" (1.651 m)            Physical Exam  Vitals and nursing note reviewed. Constitutional:       General: He is not in acute distress. Appearance: Normal appearance. He is well-developed. He is not diaphoretic. HENT:      Head: Normocephalic and atraumatic. Nose: Nose normal.   Eyes:      Extraocular Movements: Extraocular movements intact. Conjunctiva/sclera: Conjunctivae normal.      Pupils: Pupils are equal, round, and reactive to light. Cardiovascular:      Rate and Rhythm: Normal rate and regular rhythm.       Heart sounds: Normal heart sounds. Pulmonary:      Effort: Pulmonary effort is normal.      Breath sounds: Normal breath sounds. Abdominal:      General: There is no distension. Palpations: Abdomen is soft. Tenderness: There is no abdominal tenderness. Genitourinary:         Comments: No noted abscess or external hemorrhoid. Musculoskeletal:         General: No tenderness. Cervical back: Neck supple. Skin:     General: Skin is warm and dry. Neurological:      General: No focal deficit present. Mental Status: He is alert and oriented to person, place, and time. Cranial Nerves: No cranial nerve deficit. Sensory: No sensory deficit. Motor: No weakness. Coordination: Coordination normal.        MDM    69-year-old male presents with rectal pain and reported fevers at night. Mild tachycardia but otherwise vital signs stable, afebrile in no distress ambulatory without difficulty in the ED. No noted drainable abscess on physical exam.    UA negative  Labs returned mild leukocytosis of 11.8, glucose 362, known diabetic, normal bicarb and anion gap, normal renal function, LFTs. CT abdomen pelvis shows findings consistent with small perirectal abscess as well as rectal wall thickening measuring 3.1 x 1.8 cm in size. Colorectal surgery was consulted. Discussed case with Dr. Yvonne Lozoya, colorectal surgery who recommends p.o. Augmentin and will see in clinic in the next few days. This plan was discussed with the patient and his wife at the bedside and they stated both understanding and agreement. Please note that this dictation was completed with [employer], the computer voice recognition software. Quite often unanticipated grammatical, syntax, homophones, and other interpretive errors are inadvertently transcribed by the computer software. Please disregard these errors. Please excuse any errors that have escaped final proofreading.     Procedures

## 2022-12-07 NOTE — ED TRIAGE NOTES
Pt reports pain in lower back and anal pain. Symptoms started about 2 weeks ago, pain was intermittent but has been worst these past couple of days.   Denies diarrhea, nausea and vomiting  Pt denies any urinary problems    Reports fever at night